# Patient Record
Sex: FEMALE | Race: WHITE | NOT HISPANIC OR LATINO | Employment: OTHER | URBAN - METROPOLITAN AREA
[De-identification: names, ages, dates, MRNs, and addresses within clinical notes are randomized per-mention and may not be internally consistent; named-entity substitution may affect disease eponyms.]

---

## 2020-10-15 ENCOUNTER — CLINICAL SUPPORT (OUTPATIENT)
Dept: REHABILITATION | Facility: HOSPITAL | Age: 55
End: 2020-10-15
Payer: MEDICAID

## 2020-10-15 DIAGNOSIS — M51.36 DEGENERATION OF LUMBAR INTERVERTEBRAL DISC: ICD-10-CM

## 2020-10-15 DIAGNOSIS — M54.17 LUMBOSACRAL NEURITIS: ICD-10-CM

## 2020-10-15 DIAGNOSIS — M54.42 LOW BACK PAIN WITH LEFT-SIDED SCIATICA, UNSPECIFIED BACK PAIN LATERALITY, UNSPECIFIED CHRONICITY: Primary | ICD-10-CM

## 2020-10-15 PROCEDURE — 97110 THERAPEUTIC EXERCISES: CPT | Mod: PN

## 2020-10-15 PROCEDURE — 97161 PT EVAL LOW COMPLEX 20 MIN: CPT | Mod: PN

## 2020-10-15 NOTE — PLAN OF CARE
[facility]  Physical Therapy Initial Evaluation    Name: Mello Velasco  Clinic Number: [id number]    Medical Diagnosis:   Encounter Diagnoses   Name Primary?    Lumbosacral neuritis     Degeneration of lumbar intervertebral disc     Low back pain with left-sided sciatica, unspecified back pain laterality, unspecified chronicity Yes     Physician: Anabella Christopher FNP    Physician Orders: PT Eval and Treat    Therapy Diagnosis: Low back pain, deconditioned  Evaluation Date: 10/15/2020  Authorization Period Expiration: 12/31/20   Plan of Care Expiration: 11/27/20  Visit # / Visits authorized: 1/pending     Time In: 330 PM   Time Out: 415 PM   Total Billable Time: 45  minutes    Precautions: Standard    Subjective   Date of onset: 8/14/20  History of current condition - Mello reports: insidious onset of L sided back and leg pain > 2 months ago. Pt reports she went to a chiro who worked her too hard and exacerbated the pain, she was then seen by a primary care MD, who prescribed pain meds and steroids, which has somewhat improved her pain. She reports constant daily pain with a significant decline in her functional activity due to the back and leg pain      Medical History:   No past medical history on file.    Surgical History:   Mello Velasco  has no past surgical history on file.    Medications:   Mello currently has no medications in their medication list.    Allergies:   Review of patient's allergies indicates:   Allergen Reactions    Sulfa (sulfonamide antibiotics)         Imaging, Xrays : see epic     Prior Therapy: Chiropractic   Social History:    lives with their family  Occupation:    Prior Level of Function: intermittent back pain   Current Level of Function: onset of back and leg pain with decline in activity toleranc    Pain:  Current 6/10, worst 8/10, best 4/10   Location: left back and leg    Description: Aching, Grabbing and Tight  Aggravating  Factors: Sitting, Standing and Getting out of bed/chair  Easing Factors: lying flat       Objective     Posture: intact   Palpation: TTP L Piriformis   Sensation: intact       Lumbar AROM: ROM   Flexion 50 degrees -     Extension 20 degrees -     Right side bending 25 degrees   Left side bending 20 degrees *P      L/E MMT Left Right   Hip Flexion 4/5 5/5   Hip Extension 4/5 5/5   Hip Abduction 4/5 5/5   Hip Adduction 4/5 5/5   Hip IR 4/5 5/5   Hip ER 4/5 5/5   Knee Flexion 4/5 5/5   Knee Extension 4/5 5/5       Flexibility Left Right   Hamstrings 20 degrees 20 degrees   Achilles 10 degrees 10 degrees       Special Tests Left Right   SLR - -   TED - -   Piriformis - -   Slump - -                     Gait Without AD   Analysis Mild antalgic gait LLE        Other:     PSFS:   -walking dog 10/10 difficulty   -sleeping 7/10 difficulty   -sitting 8/10 difficulty   -standing 8/10 difficulty     CMS Impairment/Limitation/Restriction for FOTO  Survey    Therapist reviewed FOTO scores for Mello Velasco on 10/15/2020.   FOTO documents entered into [email or web address] - see Media section.             TREATMENT   Treatment Time In: 400 PM   Treatment Time Out: 415 PM   Total Treatment time separate from Evaluation: 15 minutes    Mello received therapeutic exercises to develop strength, endurance, ROM, flexibility, posture and core stabilization for 15 minutes including:    Exercise  10/15/20   Hip add iso  x15   Hip abd w tband Y x 15   Glute sets x15    Iso tball crunch  x15                      Home Exercises and Patient Education Provided    Education provided:   - HEP     Written Home Exercises Provided: yes.  Exercises were reviewed and Mello was able to demonstrate them prior to the end of the session.  Mello demonstrated good  understanding of the education provided.     See EMR under Patient Instructions for exercises provided 10/15/2020.    Assessment   Mello is a 55 y.o. female referred to outpatient Physical Therapy  with a medical diagnosis of low back pain . Physical exam is consistent with back and leg pain. Primary impairments include AROM, PROM, joint mobility, strength, balance, soft tissue restrictions, and pain which limits functional mobility. This pt is a good candidate for skilled PT tx and stands to benefit from a combination of manual therapy including joint mobilizations with trigger point/myofacscial release, therapeutic exercise to establish core/joint stability, neuromuscular re-education, and modalities Prn. The pt has been educated on their dx/POC and consents to further PT tx.      Pt prognosis is Good.   Pt will benefit from skilled outpatient Physical Therapy to address the deficits stated above and in the chart below, provide pt/family education, and to maximize pt's level of independence.     Plan of care discussed with patient: Yes  Pt's spiritual, cultural and educational needs considered and patient is agreeable to the plan of care and goals as stated below:     Anticipated Barriers for therapy:  Insurance     Medical Necessity is demonstrated by the following:  -8/10 back pain; leg pain   -decreased strength/stability   -decreased activity tolerance     Pt functional goal: improve activity tolerance with decreased pain sx      Short Term Goals: 3 weeks   - Tolerate PT exercises with minimal increase in pain for improved strength and conditioning   - Report 50% improvement in pain sx for improved tolerance with daily activities at home and in community.      Long Term Goals: 6 weeks  - Restore full painfree ROM to lumbar spine and L hip  for improved functional mobility   - Demonstrate improved strength of anterior core and LLE  to 5/5  for improved stability with standing and walking activities   - Report MCID with PSFS demonstrating return to PLOF with daily activities.   - Be engaged in HEP/fitness routine for continued strength and conditioning upon d/c from PT.       Plan   Plan of care  Certification: 10/15/2020 to 11/27/20 .    Outpatient Physical Therapy 1 times weekly for 6 weeks to include the following interventions: Manual Therapy, Neuromuscular Re-ed, Patient Education, Self Care, Therapeutic Activites and Therapeutic Exercise.     Devon Emanuel, PT

## 2020-10-22 ENCOUNTER — CLINICAL SUPPORT (OUTPATIENT)
Dept: REHABILITATION | Facility: HOSPITAL | Age: 55
End: 2020-10-22
Payer: MEDICAID

## 2020-10-22 DIAGNOSIS — M54.17 LUMBOSACRAL NEURITIS: Primary | ICD-10-CM

## 2020-10-22 DIAGNOSIS — M51.36 DEGENERATION OF LUMBAR INTERVERTEBRAL DISC: ICD-10-CM

## 2020-10-22 DIAGNOSIS — M54.42 LOW BACK PAIN WITH LEFT-SIDED SCIATICA, UNSPECIFIED BACK PAIN LATERALITY, UNSPECIFIED CHRONICITY: ICD-10-CM

## 2020-10-22 PROCEDURE — 97110 THERAPEUTIC EXERCISES: CPT | Mod: PN

## 2020-10-22 NOTE — PROGRESS NOTES
Physical Therapy Daily Treatment Note     Name: Mello Velasco  Clinic Number: [id number]    Therapy Diagnosis:   Encounter Diagnoses   Name Primary?    Lumbosacral neuritis Yes    Degeneration of lumbar intervertebral disc     Low back pain with left-sided sciatica, unspecified back pain laterality, unspecified chronicity      Physician: Anabella Christopher FNP    Visit Date: 10/22/2020       Physician Orders: PT Eval and Treat    Therapy Diagnosis: Low back pain, deconditioned  Evaluation Date: 10/15/2020  Authorization Period Expiration: 12/31/20   Plan of Care Expiration: 11/27/20  Visit # / Visits authorized: 2       Time In: 1015 am  Time Out: 1035 am   Total Billable Time: 20 minutes    Precautions: Standard    Subjective     Pt reports: she is hurting worse after home exercises, she still has pain along the left side of her low back and buttock that she feels is not related to her muscles. She is very concerned that something is wrong with her back and does not feel that physical therapy and exercise will be able to help her at this time.     She was compliant with home exercise program.  Response to previous treatment: increased pain  Functional change: na     Pain: 8/10  Location: left back and buttock       Objective     Mello received therapeutic exercises to develop strength, endurance, ROM, flexibility, posture and core stabilization for 20 minutes including:       Exercise  10/15/20   Hip add iso  x15   Hip abd w tband Y x 15   Glute sets x15    Iso tball crunch  x15    SI joint MET    x 3     Hip ext standing  X 10     Hip ext on table  X 10        Home Exercises Provided and Patient Education Provided     Education provided:   Updated HEP to assist with stabilization of low back and pelvis     Assessment     Pt unable to tolerate PT intervention at this time due to back pain sx, she plans to return to medical care provider for further pain mgmt options.     Mello is not progressing well  towards her goals.   Pt prognosis is Guarded.     Pt will continue to benefit from skilled outpatient physical therapy to address the deficits listed in the problem list box on initial evaluation, provide pt/family education and to maximize pt's level of independence in the home and community environment.     Pt's spiritual, cultural and educational needs considered and pt agreeable to plan of care and goals.    Anticipated barriers to physical therapy: activity tolerance, coping     Medical Necessity is demonstrated by the following:  -8/10 back pain; leg pain   -decreased strength/stability   -decreased activity tolerance      Pt functional goal: improve activity tolerance with decreased pain sx       Short Term Goals: 3 weeks   - Tolerate PT exercises with minimal increase in pain for improved strength and conditioning   - Report 50% improvement in pain sx for improved tolerance with daily activities at home and in community.        Long Term Goals: 6 weeks  - Restore full painfree ROM to lumbar spine and L hip  for improved functional mobility   - Demonstrate improved strength of anterior core and LLE  to 5/5  for improved stability with standing and walking activities   - Report MCID with PSFS demonstrating return to PLOF with daily activities.   - Be engaged in HEP/fitness routine for continued strength and conditioning upon d/c from PT.       Plan     Hold PT Plan of care at this time as pt plans to return to her medical provider for further evaluation of her back and alternative pain mgmt options.     Devon Emanuel, PT

## 2021-07-07 ENCOUNTER — CLINICAL SUPPORT (OUTPATIENT)
Dept: URGENT CARE | Facility: CLINIC | Age: 56
End: 2021-07-07
Payer: MEDICAID

## 2021-07-07 DIAGNOSIS — Z20.822 ENCOUNTER FOR LABORATORY TESTING FOR COVID-19 VIRUS: Primary | ICD-10-CM

## 2021-07-07 LAB
CTP QC/QA: YES
SARS-COV-2 RDRP RESP QL NAA+PROBE: NEGATIVE

## 2021-07-07 PROCEDURE — 87635 SARS-COV-2 COVID-19 AMP PRB: CPT | Mod: QW,S$GLB,, | Performed by: FAMILY MEDICINE

## 2021-07-07 PROCEDURE — 99211 PR OFFICE/OUTPT VISIT, EST, LEVL I: ICD-10-PCS | Mod: S$GLB,CS,, | Performed by: FAMILY MEDICINE

## 2021-07-07 PROCEDURE — 99211 OFF/OP EST MAY X REQ PHY/QHP: CPT | Mod: S$GLB,CS,, | Performed by: FAMILY MEDICINE

## 2021-07-07 PROCEDURE — 87635: ICD-10-PCS | Mod: QW,S$GLB,, | Performed by: FAMILY MEDICINE

## 2023-10-24 PROBLEM — I25.84 CORONARY ARTERY CALCIFICATION: Status: ACTIVE | Noted: 2023-10-24

## 2023-10-24 PROBLEM — I25.10 CORONARY ARTERY CALCIFICATION: Status: ACTIVE | Noted: 2023-10-24
